# Patient Record
Sex: MALE | Race: WHITE
[De-identification: names, ages, dates, MRNs, and addresses within clinical notes are randomized per-mention and may not be internally consistent; named-entity substitution may affect disease eponyms.]

---

## 2017-08-29 ENCOUNTER — HOSPITAL ENCOUNTER (EMERGENCY)
Dept: HOSPITAL 1 - ED | Age: 32
Discharge: HOME | End: 2017-08-29
Payer: MEDICAID

## 2017-08-29 VITALS — SYSTOLIC BLOOD PRESSURE: 138 MMHG | DIASTOLIC BLOOD PRESSURE: 85 MMHG

## 2017-08-29 VITALS — DIASTOLIC BLOOD PRESSURE: 106 MMHG | SYSTOLIC BLOOD PRESSURE: 156 MMHG

## 2017-08-29 DIAGNOSIS — R42: Primary | ICD-10-CM

## 2017-08-29 DIAGNOSIS — L02.412: Primary | ICD-10-CM

## 2017-09-01 ENCOUNTER — HOSPITAL ENCOUNTER (EMERGENCY)
Dept: HOSPITAL 1 - ED | Age: 32
Discharge: HOME | End: 2017-09-01
Payer: MEDICAID

## 2017-09-01 VITALS — SYSTOLIC BLOOD PRESSURE: 158 MMHG | DIASTOLIC BLOOD PRESSURE: 120 MMHG

## 2017-09-01 VITALS — HEIGHT: 68 IN | WEIGHT: 268.99 LBS | BODY MASS INDEX: 40.77 KG/M2

## 2017-09-01 DIAGNOSIS — Z48.01: Primary | ICD-10-CM

## 2018-01-01 ENCOUNTER — HOSPITAL ENCOUNTER (EMERGENCY)
Dept: HOSPITAL 1 - ED | Age: 33
LOS: 1 days | Discharge: HOME | End: 2018-01-02
Payer: SELF-PAY

## 2018-01-01 VITALS — HEIGHT: 67 IN | BODY MASS INDEX: 45.52 KG/M2 | WEIGHT: 290 LBS
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
## 2018-01-01 DIAGNOSIS — S29.011A: Primary | ICD-10-CM
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
## 2018-01-02 VITALS — SYSTOLIC BLOOD PRESSURE: 161 MMHG | DIASTOLIC BLOOD PRESSURE: 110 MMHG

## 2018-01-02 LAB
ALBUMIN SERPL-MCNC: 3.7 G/DL (ref 3.4–5)
ALP SERPL-CCNC: 84 U/L (ref 46–116)
ALT SERPL-CCNC: 120 U/L (ref 16–63)
AST SERPL-CCNC: 51 U/L (ref 15–37)
BASOPHILS NFR BLD: 0.3 % (ref 0–2)
BILIRUB SERPL-MCNC: 0.25 MG/DL (ref 0.2–1)
BUN SERPL-MCNC: 20 MG/DL (ref 7–18)
CALCIUM SERPL-MCNC: 8.9 MG/DL (ref 8.5–10.1)
CHLORIDE SERPL-SCNC: 104 MMOL/L (ref 98–107)
CO2 SERPL-SCNC: 26.8 MMOL/L (ref 21–32)
CREAT SERPL-MCNC: 1 MG/DL (ref 0.7–1.3)
ERYTHROCYTE [DISTWIDTH] IN BLOOD BY AUTOMATED COUNT: 13.6 % (ref 11.5–14.5)
GFR SERPLBLD BASED ON 1.73 SQ M-ARVRAT: > 60 ML/MIN
GLUCOSE SERPL-MCNC: 123 MG/DL (ref 74–106)
LIPASE SERPL-CCNC: 217 IU/L (ref 73–393)
PLATELET # BLD: 278 X10^3MCL (ref 130–400)
POTASSIUM SERPL-SCNC: 3.2 MMOL/L (ref 3.5–5.1)
PROT SERPL-MCNC: 7.8 G/DL (ref 6.4–8.2)
SODIUM SERPL-SCNC: 141 MMOL/L (ref 136–145)

## 2019-08-28 ENCOUNTER — HOSPITAL ENCOUNTER (EMERGENCY)
Dept: HOSPITAL 1 - ED | Age: 34
Discharge: HOME | End: 2019-08-28
Payer: SELF-PAY

## 2019-08-28 VITALS
WEIGHT: 309 LBS | WEIGHT: 309 LBS | HEIGHT: 67 IN | HEIGHT: 67 IN | BODY MASS INDEX: 48.5 KG/M2 | BODY MASS INDEX: 48.5 KG/M2

## 2019-08-28 VITALS — SYSTOLIC BLOOD PRESSURE: 153 MMHG | DIASTOLIC BLOOD PRESSURE: 94 MMHG
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
## 2019-08-28 DIAGNOSIS — S70.01XA: ICD-10-CM
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
## 2019-08-28 DIAGNOSIS — S92.251A: Primary | ICD-10-CM

## 2020-09-28 ENCOUNTER — HOSPITAL ENCOUNTER (EMERGENCY)
Dept: HOSPITAL 1 - ED | Age: 35
Discharge: HOME | End: 2020-09-28
Payer: MEDICAID

## 2020-09-28 VITALS — BODY MASS INDEX: 43.63 KG/M2 | HEIGHT: 67 IN | WEIGHT: 278 LBS

## 2020-09-28 VITALS — DIASTOLIC BLOOD PRESSURE: 88 MMHG | SYSTOLIC BLOOD PRESSURE: 172 MMHG

## 2020-09-28 DIAGNOSIS — R19.8: ICD-10-CM

## 2020-09-28 DIAGNOSIS — Z20.828: ICD-10-CM

## 2020-09-28 DIAGNOSIS — R63.0: ICD-10-CM

## 2020-09-28 DIAGNOSIS — B34.9: Primary | ICD-10-CM

## 2020-09-28 DIAGNOSIS — J02.9: ICD-10-CM

## 2022-10-25 ENCOUNTER — HOSPITAL ENCOUNTER (EMERGENCY)
Dept: HOSPITAL 26 - MED | Age: 37
Discharge: HOME | End: 2022-10-25
Payer: MEDICAID

## 2022-10-25 VITALS — HEIGHT: 68 IN | WEIGHT: 312 LBS | BODY MASS INDEX: 47.29 KG/M2

## 2022-10-25 VITALS — SYSTOLIC BLOOD PRESSURE: 215 MMHG | DIASTOLIC BLOOD PRESSURE: 131 MMHG

## 2022-10-25 DIAGNOSIS — H72.91: Primary | ICD-10-CM

## 2022-10-25 DIAGNOSIS — I10: ICD-10-CM

## 2022-10-25 DIAGNOSIS — H60.91: ICD-10-CM

## 2022-10-25 DIAGNOSIS — Z79.899: ICD-10-CM

## 2022-10-25 DIAGNOSIS — H66.91: ICD-10-CM

## 2022-10-25 NOTE — NUR
Patient discharged with v/s stable. Written and verbal after care instructions 
given and explained for Otitis Media, Ear drainage, Hypertension and Eardrum 
rupture. 

Patient alert, oriented and verbalized understanding of instructions. 
Ambulatory with steady gait. All questions addressed prior to discharge. ID 
band removed. Patient advised to follow up with PMD. Rx of Amoxicillin and 
Ofloxacin given. Patient educated on indication of medication including 
possible reaction and side effects. Opportunity to ask questions provided and 
answered.

## 2023-04-13 ENCOUNTER — HOSPITAL ENCOUNTER (EMERGENCY)
Dept: HOSPITAL 26 - MED | Age: 38
Discharge: HOME | End: 2023-04-13
Payer: MEDICAID

## 2023-04-13 VITALS — WEIGHT: 315 LBS | BODY MASS INDEX: 47.74 KG/M2 | HEIGHT: 68 IN

## 2023-04-13 VITALS — DIASTOLIC BLOOD PRESSURE: 116 MMHG | SYSTOLIC BLOOD PRESSURE: 170 MMHG

## 2023-04-13 DIAGNOSIS — I10: ICD-10-CM

## 2023-04-13 DIAGNOSIS — Z79.899: ICD-10-CM

## 2023-04-13 DIAGNOSIS — R07.9: Primary | ICD-10-CM

## 2023-04-13 DIAGNOSIS — Z79.891: ICD-10-CM

## 2023-04-13 DIAGNOSIS — Z79.2: ICD-10-CM

## 2023-04-13 DIAGNOSIS — R74.01: ICD-10-CM

## 2023-04-13 LAB
ALBUMIN FLD-MCNC: 3.9 G/DL (ref 3.4–5)
ANION GAP SERPL CALCULATED.3IONS-SCNC: 13.1 MMOL/L (ref 8–16)
AST SERPL-CCNC: 118 U/L (ref 15–37)
BASOPHILS # BLD AUTO: 0 K/UL (ref 0–0.22)
BASOPHILS NFR BLD AUTO: 0.3 % (ref 0–2)
BILIRUB SERPL-MCNC: 0.4 MG/DL (ref 0–1)
BUN SERPL-MCNC: 20 MG/DL (ref 7–18)
CHLORIDE SERPL-SCNC: 102 MMOL/L (ref 98–107)
CO2 SERPL-SCNC: 29.7 MMOL/L (ref 21–32)
CREAT SERPL-MCNC: 1.1 MG/DL (ref 0.6–1.3)
EOSINOPHIL # BLD AUTO: 0.2 K/UL (ref 0–0.4)
EOSINOPHIL NFR BLD AUTO: 2 % (ref 0–4)
ERYTHROCYTE [DISTWIDTH] IN BLOOD BY AUTOMATED COUNT: 14.5 % (ref 11.6–13.7)
GFR SERPL CREATININE-BSD FRML MDRD: 97 ML/MIN (ref 90–?)
GLUCOSE SERPL-MCNC: 115 MG/DL (ref 74–106)
HCT VFR BLD AUTO: 42.3 % (ref 36–52)
HGB BLD-MCNC: 14.1 G/DL (ref 12–18)
LYMPHOCYTES # BLD AUTO: 2 K/UL (ref 2–11.5)
LYMPHOCYTES NFR BLD AUTO: 22.6 % (ref 20.5–51.1)
MCH RBC QN AUTO: 30 PG (ref 27–31)
MCHC RBC AUTO-ENTMCNC: 33 G/DL (ref 33–37)
MCV RBC AUTO: 88.9 FL (ref 80–94)
MONOCYTES # BLD AUTO: 0.7 K/UL (ref 0.8–1)
MONOCYTES NFR BLD AUTO: 7.5 % (ref 1.7–9.3)
NEUTROPHILS # BLD AUTO: 6 K/UL (ref 1.8–7.7)
NEUTROPHILS NFR BLD AUTO: 67.6 % (ref 42.2–75.2)
PLATELET # BLD AUTO: 236 K/UL (ref 140–450)
POTASSIUM SERPL-SCNC: 3.8 MMOL/L (ref 3.5–5.1)
RBC # BLD AUTO: 4.76 MIL/UL (ref 4.2–6.1)
SODIUM SERPL-SCNC: 141 MMOL/L (ref 136–145)
WBC # BLD AUTO: 8.8 K/UL (ref 4.8–10.8)

## 2023-04-13 PROCEDURE — 80053 COMPREHEN METABOLIC PANEL: CPT

## 2023-04-13 PROCEDURE — 96372 THER/PROPH/DIAG INJ SC/IM: CPT

## 2023-04-13 PROCEDURE — 84484 ASSAY OF TROPONIN QUANT: CPT

## 2023-04-13 PROCEDURE — 85025 COMPLETE CBC W/AUTO DIFF WBC: CPT

## 2023-04-13 PROCEDURE — 71045 X-RAY EXAM CHEST 1 VIEW: CPT

## 2023-04-13 PROCEDURE — 93005 ELECTROCARDIOGRAM TRACING: CPT

## 2023-04-13 PROCEDURE — 36415 COLL VENOUS BLD VENIPUNCTURE: CPT

## 2023-04-13 PROCEDURE — 99285 EMERGENCY DEPT VISIT HI MDM: CPT

## 2023-04-13 NOTE — NUR
36 y/o male bib self with c/o midsternal chest pain. Per patient, was 
discharged from [facility] on 4/6/23. Patient reports a new 
Carvedilol medication and symptoms have started since then. Patient also 
reports SOB, dizziness and headache. Denies any fevers or chills. Denies taking 
any medication for symptoms.



Medical History: HTN

NKDA

## 2023-04-14 ENCOUNTER — HOSPITAL ENCOUNTER (EMERGENCY)
Dept: HOSPITAL 26 - MED | Age: 38
LOS: 1 days | Discharge: HOME | End: 2023-04-15
Payer: MEDICAID

## 2023-04-14 VITALS — WEIGHT: 315 LBS | HEIGHT: 68 IN | BODY MASS INDEX: 47.74 KG/M2

## 2023-04-14 VITALS — SYSTOLIC BLOOD PRESSURE: 175 MMHG | DIASTOLIC BLOOD PRESSURE: 87 MMHG

## 2023-04-14 DIAGNOSIS — R07.9: ICD-10-CM

## 2023-04-14 DIAGNOSIS — Z79.899: ICD-10-CM

## 2023-04-14 DIAGNOSIS — Z72.89: ICD-10-CM

## 2023-04-14 DIAGNOSIS — I10: ICD-10-CM

## 2023-04-14 DIAGNOSIS — F14.90: ICD-10-CM

## 2023-04-14 DIAGNOSIS — R51.9: ICD-10-CM

## 2023-04-14 DIAGNOSIS — R11.2: ICD-10-CM

## 2023-04-14 DIAGNOSIS — R10.13: Primary | ICD-10-CM

## 2023-04-14 LAB
ALBUMIN FLD-MCNC: 4.1 G/DL (ref 3.4–5)
ANION GAP SERPL CALCULATED.3IONS-SCNC: 13.7 MMOL/L (ref 8–16)
AST SERPL-CCNC: 43 U/L (ref 15–37)
BASOPHILS # BLD AUTO: 0 K/UL (ref 0–0.22)
BASOPHILS NFR BLD AUTO: 0.2 % (ref 0–2)
BILIRUB SERPL-MCNC: 0.7 MG/DL (ref 0–1)
BUN SERPL-MCNC: 15 MG/DL (ref 7–18)
CHLORIDE SERPL-SCNC: 103 MMOL/L (ref 98–107)
CO2 SERPL-SCNC: 27.3 MMOL/L (ref 21–32)
CREAT SERPL-MCNC: 1.1 MG/DL (ref 0.6–1.3)
EOSINOPHIL # BLD AUTO: 0 K/UL (ref 0–0.4)
EOSINOPHIL NFR BLD AUTO: 0.4 % (ref 0–4)
ERYTHROCYTE [DISTWIDTH] IN BLOOD BY AUTOMATED COUNT: 14.3 % (ref 11.6–13.7)
GFR SERPL CREATININE-BSD FRML MDRD: 97 ML/MIN (ref 90–?)
GLUCOSE SERPL-MCNC: 145 MG/DL (ref 74–106)
HCT VFR BLD AUTO: 45.2 % (ref 36–52)
HGB BLD-MCNC: 15.1 G/DL (ref 12–18)
LIPASE SERPL-CCNC: 129 U/L (ref 73–393)
LYMPHOCYTES # BLD AUTO: 1 K/UL (ref 2–11.5)
LYMPHOCYTES NFR BLD AUTO: 8.2 % (ref 20.5–51.1)
MCH RBC QN AUTO: 30 PG (ref 27–31)
MCHC RBC AUTO-ENTMCNC: 34 G/DL (ref 33–37)
MCV RBC AUTO: 88.5 FL (ref 80–94)
MONOCYTES # BLD AUTO: 0.5 K/UL (ref 0.8–1)
MONOCYTES NFR BLD AUTO: 3.7 % (ref 1.7–9.3)
NEUTROPHILS # BLD AUTO: 10.9 K/UL (ref 1.8–7.7)
NEUTROPHILS NFR BLD AUTO: 87.5 % (ref 42.2–75.2)
PLATELET # BLD AUTO: 242 K/UL (ref 140–450)
POTASSIUM SERPL-SCNC: 4 MMOL/L (ref 3.5–5.1)
RBC # BLD AUTO: 5.11 MIL/UL (ref 4.2–6.1)
SODIUM SERPL-SCNC: 140 MMOL/L (ref 136–145)
WBC # BLD AUTO: 12.4 K/UL (ref 4.8–10.8)

## 2023-04-14 PROCEDURE — 84484 ASSAY OF TROPONIN QUANT: CPT

## 2023-04-14 PROCEDURE — 83690 ASSAY OF LIPASE: CPT

## 2023-04-14 PROCEDURE — 80053 COMPREHEN METABOLIC PANEL: CPT

## 2023-04-14 PROCEDURE — 76705 ECHO EXAM OF ABDOMEN: CPT

## 2023-04-14 PROCEDURE — 93005 ELECTROCARDIOGRAM TRACING: CPT

## 2023-04-14 PROCEDURE — 70450 CT HEAD/BRAIN W/O DYE: CPT

## 2023-04-14 PROCEDURE — 80305 DRUG TEST PRSMV DIR OPT OBS: CPT

## 2023-04-14 PROCEDURE — 85025 COMPLETE CBC W/AUTO DIFF WBC: CPT

## 2023-04-14 PROCEDURE — 74174 CTA ABD&PLVS W/CONTRAST: CPT

## 2023-04-14 PROCEDURE — 96374 THER/PROPH/DIAG INJ IV PUSH: CPT

## 2023-04-14 PROCEDURE — 71275 CT ANGIOGRAPHY CHEST: CPT

## 2023-04-14 PROCEDURE — 99285 EMERGENCY DEPT VISIT HI MDM: CPT

## 2023-04-14 PROCEDURE — 96375 TX/PRO/DX INJ NEW DRUG ADDON: CPT

## 2023-04-14 PROCEDURE — 96361 HYDRATE IV INFUSION ADD-ON: CPT

## 2023-04-14 PROCEDURE — 36415 COLL VENOUS BLD VENIPUNCTURE: CPT

## 2023-04-14 NOTE — NUR
38 Y/O M BIBA C/O  NAUSEA AND VOMITING AND DIZZINESS SINCE THIS MORNING. WAS IN 
ED YESTERDAY FOR C/O HIGH BLOOD PRESSURE AND WAS SENT HOME. 



NKA

PMH: HTN

## 2023-04-14 NOTE — NUR
Patient resting in bed, A/Ox4, chest rise and fall symmetrical, no c/o pain or 
s/s of distress, on monitor.

## 2023-04-15 VITALS — SYSTOLIC BLOOD PRESSURE: 137 MMHG | DIASTOLIC BLOOD PRESSURE: 87 MMHG

## 2023-04-15 LAB
BARBITURATES UR QL SCN: NEGATIVE NG/ML
BENZODIAZ UR QL SCN: NEGATIVE NG/ML
BZE UR QL SCN: NEGATIVE NG/ML
CANNABINOIDS UR QL SCN: NEGATIVE NG/ML
OPIATES UR QL SCN: POSITIVE NG/ML
PCP UR QL SCN: NEGATIVE NG/ML

## 2023-04-15 NOTE — NUR
Patient resting in bed, A/Ox4, chest rise and fall symmetrical, no c/o painor 
s/s of distress, on monitor.